# Patient Record
Sex: FEMALE | Race: WHITE | NOT HISPANIC OR LATINO | URBAN - METROPOLITAN AREA
[De-identification: names, ages, dates, MRNs, and addresses within clinical notes are randomized per-mention and may not be internally consistent; named-entity substitution may affect disease eponyms.]

---

## 2022-10-26 ENCOUNTER — OFFICE VISIT (OUTPATIENT)
Dept: URBAN - METROPOLITAN AREA CLINIC 15 | Facility: CLINIC | Age: 72
End: 2022-10-26
Payer: MEDICARE

## 2022-10-26 DIAGNOSIS — J47.9 BRONCHIECTASIS: ICD-10-CM

## 2022-10-26 DIAGNOSIS — Z79.899 OTHER LONG TERM (CURRENT) DRUG THERAPY: ICD-10-CM

## 2022-10-26 DIAGNOSIS — H25.13 AGE-RELATED NUCLEAR CATARACT, BILATERAL: Primary | ICD-10-CM

## 2022-10-26 PROCEDURE — 99204 OFFICE O/P NEW MOD 45 MIN: CPT | Performed by: OPHTHALMOLOGY

## 2022-10-26 PROCEDURE — 92133 CPTRZD OPH DX IMG PST SGM ON: CPT | Performed by: OPHTHALMOLOGY

## 2022-10-26 PROCEDURE — 92134 CPTRZ OPH DX IMG PST SGM RTA: CPT | Performed by: OPHTHALMOLOGY

## 2022-10-26 ASSESSMENT — INTRAOCULAR PRESSURE
OS: 13
OD: 12
OS: 14

## 2022-10-26 NOTE — IMPRESSION/PLAN
Impression: Other long term (current) drug therapy: Z79.899. Plan: Pt. reports taking 1800mg a day of ethambutol/d average--she takes 1200mg 3x/week=3600mg/wk, or approx 515mg/d x 10 months  weight is 58kg  9mg/kg/d ON OCT ordered and reviewed with pt, Pt. to RT for HVF 24-2 and f/u

DR. Orin Smith. THE PT HAS NO OCULAR SYMPTOMS--AND I HAVE ORDERED A FORMAL VISUAL FIELD. HER COLOR VISION IS NORMAL, BUT THERE IS A HINT OF GANGLION CELL LAYER THINNING ON HER OCT. THERE ARE NO PRIOER STUDIES. UNDERSTANDING HER UNDERLYING CONDITION, I TRUST YOUR JUDGMENT TO HAVE HER ON THE LOWEST DOSE OF ETHAMBUTOL FOR THE SHORTEST PERIOD. I WILL SEE HER IN 8 WEEKS, AND REPORT ON HER VISUAL FIELD.   PLEASE CALL ME W/ ANY QUESTIONS

## 2022-11-11 ENCOUNTER — TESTING ONLY (OUTPATIENT)
Dept: URBAN - METROPOLITAN AREA CLINIC 15 | Facility: CLINIC | Age: 72
End: 2022-11-11
Payer: MEDICARE

## 2022-11-11 DIAGNOSIS — Z79.899 OTHER LONG TERM (CURRENT) DRUG THERAPY: Primary | ICD-10-CM

## 2023-01-04 ENCOUNTER — OFFICE VISIT (OUTPATIENT)
Dept: URBAN - METROPOLITAN AREA CLINIC 15 | Facility: CLINIC | Age: 73
End: 2023-01-04
Payer: MEDICARE

## 2023-01-04 DIAGNOSIS — J47.9 BRONCHIECTASIS: ICD-10-CM

## 2023-01-04 DIAGNOSIS — Z79.899 OTHER LONG TERM (CURRENT) DRUG THERAPY: Primary | ICD-10-CM

## 2023-01-04 PROCEDURE — 99213 OFFICE O/P EST LOW 20 MIN: CPT | Performed by: OPHTHALMOLOGY

## 2023-01-04 ASSESSMENT — INTRAOCULAR PRESSURE
OD: 13
OS: 12
OD: 12
OS: 13

## 2023-01-04 NOTE — IMPRESSION/PLAN
Impression: Other long term (current) drug therapy: Z79.899. Plan: Pt. reports taking 1800mg a day of ethambutol/d average--she takes 1200mg 3x/week=3600mg/wk, or approx 515mg/d x 10 months  weight is 58kg  9mg/kg/d

DR. Solano . THE PT HAS NO OCULAR SYMPTOMS--AND I HAVE ORDERED A FORMAL VISUAL FIELD--WHICH IS NORMAL  HER COLOR VISION IS NORMAL, BUT THERE IS A HINT OF GANGLION CELL LAYER THINNING ON HER OCT. THERE ARE NO PRIOR STUDIES. UNDERSTANDING HER UNDERLYING CONDITION, I TRUST YOUR JUDGMENT TO HAVE HER ON THE LOWEST DOSE OF ETHAMBUTOL FOR THE SHORTEST PERIOD. I WILL SEE HER IN 12 WEEKS, AND REPORT ON HER VISUAL FIELD.   PLEASE CALL ME W/ ANY QUESTIONS  PER THE LITERATURE, HER RISK OF OPTIC NEUROPATHY AT THIS DOSE IS BELOW 5%

## 2023-04-19 ENCOUNTER — OFFICE VISIT (OUTPATIENT)
Dept: URBAN - METROPOLITAN AREA CLINIC 15 | Facility: CLINIC | Age: 73
End: 2023-04-19
Payer: MEDICARE

## 2023-04-19 DIAGNOSIS — H25.13 AGE-RELATED NUCLEAR CATARACT, BILATERAL: Primary | ICD-10-CM

## 2023-04-19 DIAGNOSIS — Z79.899 OTHER LONG TERM (CURRENT) DRUG THERAPY: ICD-10-CM

## 2023-04-19 PROCEDURE — 99213 OFFICE O/P EST LOW 20 MIN: CPT | Performed by: OPHTHALMOLOGY

## 2023-04-19 ASSESSMENT — INTRAOCULAR PRESSURE
OS: 11
OD: 11

## 2023-04-19 ASSESSMENT — VISUAL ACUITY
OD: 20/50
OS: 20/40

## 2023-04-19 NOTE — IMPRESSION/PLAN
Impression: Other long term (current) drug therapy: Z79.899. Plan: Pt. reports taking 1800mg a day of ethambutol/d average--she takes 1200mg 3x/week=3600mg/wk, or approx 515mg/d x 10 months  weight is 58kg  9mg/kg/d Pt.  is stopping Ethambutol June 1st

## 2023-04-19 NOTE — IMPRESSION/PLAN
Impression: Age-related nuclear cataract, bilateral: H25.13. Plan: Cataracts account for patients complaints. Patient desires to hold off on cataract surgery at this time. alert and awake

## 2023-08-16 ENCOUNTER — OFFICE VISIT (OUTPATIENT)
Dept: URBAN - METROPOLITAN AREA CLINIC 15 | Facility: CLINIC | Age: 73
End: 2023-08-16
Payer: MEDICARE

## 2023-08-16 DIAGNOSIS — H25.813 COMBINED FORMS OF AGE-RELATED CATARACT, BILATERAL: Primary | ICD-10-CM

## 2023-08-16 DIAGNOSIS — Z79.899 OTHER LONG TERM (CURRENT) DRUG THERAPY: ICD-10-CM

## 2023-08-16 PROCEDURE — 99214 OFFICE O/P EST MOD 30 MIN: CPT | Performed by: OPHTHALMOLOGY

## 2023-08-16 ASSESSMENT — VISUAL ACUITY
OD: 20/40
OS: 20/40

## 2023-08-16 ASSESSMENT — INTRAOCULAR PRESSURE
OS: 14
OD: 12
OD: 14
OS: 12

## 2023-11-27 ENCOUNTER — OFFICE VISIT (OUTPATIENT)
Dept: URBAN - METROPOLITAN AREA CLINIC 52 | Facility: CLINIC | Age: 73
End: 2023-11-27
Payer: MEDICARE

## 2023-11-27 DIAGNOSIS — H25.813 COMBINED FORMS OF AGE-RELATED CATARACT, BILATERAL: Primary | ICD-10-CM

## 2023-11-27 PROCEDURE — 99213 OFFICE O/P EST LOW 20 MIN: CPT | Performed by: OPHTHALMOLOGY

## 2023-11-27 PROCEDURE — 92025 CPTRIZED CORNEAL TOPOGRAPHY: CPT | Performed by: OPHTHALMOLOGY

## 2023-11-27 ASSESSMENT — INTRAOCULAR PRESSURE
OD: 18
OS: 17